# Patient Record
(demographics unavailable — no encounter records)

---

## 2024-11-01 NOTE — DISCUSSION/SUMMARY
[FreeTextEntry1] : 1. HLD: recommend CT Heart for Calcium Scoring for further risk stratification.   Office will call with results  Follow up in 1 year. [EKG obtained to assist in diagnosis and management of assessed problem(s)] : EKG obtained to assist in diagnosis and management of assessed problem(s)

## 2024-11-01 NOTE — HISTORY OF PRESENT ILLNESS
[FreeTextEntry1] : 58 year old male with PMHx of HLD presents for a cardiac evaluation and further risk stratification. He is hesitant about starting statin therapy despite  on labs from 10/2024. He has no chest pain, dyspnea or palpitations.  Light cigar smoker.  There is no history of MI, CVA, CHF, or previous coronary intervention.